# Patient Record
(demographics unavailable — no encounter records)

---

## 2024-10-26 NOTE — DISCUSSION/SUMMARY
[FreeTextEntry1] : pt was seen by cardiology all normal pt doing well in school followed by neuro due to head trauma and concussion lungs clear good air entry apply bactroban tid blood test Symptomatic therapy Practical allergen avoidance discussed Trial: Zyrtec, Allegra, or Claritin O.D and Flonase O.D. Call if no better 1 week recheck in office PRN

## 2024-10-26 NOTE — PHYSICAL EXAM
[Allergic Shiners] : allergic shiners [Symmetric Chest Wall] : symmetric chest wall [NL] : moves all extremities x4, warm, well perfused x4 [de-identified] : pain on palpation [de-identified] : oozing under right breast

## 2024-10-30 NOTE — HISTORY OF PRESENT ILLNESS
[Yes] : Patient goes to dentist yearly [Delayed] : Delayed [Normal] : normal [Days of Bleeding: _____] : Days of bleeding: [unfilled] [Eats meals with family] : eats meals with family [Has family members/adults to turn to for help] : has family members/adults to turn to for help [Is permitted and is able to make independent decisions] : Is permitted and is able to make independent decisions [Grade: ____] : Grade: [unfilled] [Normal Performance] : normal performance [Normal Behavior/Attention] : normal behavior/attention [Normal Homework] : normal homework [Eats regular meals including adequate fruits and vegetables] : eats regular meals including adequate fruits and vegetables [Drinks non-sweetened liquids] : drinks non-sweetened liquids  [Calcium source] : calcium source [Has friends] : has friends [At least 1 hour of physical activity a day] : at least 1 hour of physical activity a day [Screen time (except homework) less than 2 hours a day] : screen time (except homework) less than 2 hours a day [Has interests/participates in community activities/volunteers] : has interests/participates in community activities/volunteers. [Mother] : mother [Sleep Concerns] : no sleep concerns [Has concerns about body or appearance] : does not have concerns about body or appearance [Uses electronic nicotine delivery system] : does not use electronic nicotine delivery system [Exposure to electronic nicotine delivery system] : no exposure to electronic nicotine delivery system [Uses tobacco] : does not use tobacco [Exposure to tobacco] : no exposure to tobacco [Uses drugs] : does not use drugs  [Exposure to drugs] : no exposure to drugs [Drinks alcohol] : does not drink alcohol [Exposure to alcohol] : no exposure to alcohol [Uses safety belts/safety equipment] : uses safety belts/safety equipment  [Impaired/distracted driving] : no impaired/distracted driving [Has peer relationships free of violence] : has peer relationships free of violence [No] : Patient has not had sexual intercourse. [HIV Screening Declined] : HIV Screening Declined [Has ways to cope with stress] : has ways to cope with stress [Displays self-confidence] : displays self-confidence [Has problems with sleep] : does not have problems with sleep [Gets depressed, anxious, or irritable/has mood swings] : gets depressed, anxious, or irritable/has mood swings [With Teen] : teen [With Parent/Guardian] : parent/guardian [FreeTextEntry7] : Sees neurologist and psychologist.  [de-identified] : Since concussion of March 2020 has been having memory loss issues. Sees psychologists once a week. Also hasn't been feeling well past couple of days. Seen on Saturday for chest pain and stomach pain. Since then has developed nasal congestion and headache.

## 2024-10-30 NOTE — HISTORY OF PRESENT ILLNESS
[Yes] : Patient goes to dentist yearly [Delayed] : Delayed [Normal] : normal [Days of Bleeding: _____] : Days of bleeding: [unfilled] [Eats meals with family] : eats meals with family [Has family members/adults to turn to for help] : has family members/adults to turn to for help [Is permitted and is able to make independent decisions] : Is permitted and is able to make independent decisions [Grade: ____] : Grade: [unfilled] [Normal Performance] : normal performance [Normal Behavior/Attention] : normal behavior/attention [Normal Homework] : normal homework [Eats regular meals including adequate fruits and vegetables] : eats regular meals including adequate fruits and vegetables [Drinks non-sweetened liquids] : drinks non-sweetened liquids  [Calcium source] : calcium source [Has friends] : has friends [At least 1 hour of physical activity a day] : at least 1 hour of physical activity a day [Screen time (except homework) less than 2 hours a day] : screen time (except homework) less than 2 hours a day [Has interests/participates in community activities/volunteers] : has interests/participates in community activities/volunteers. [Mother] : mother [Sleep Concerns] : no sleep concerns [Has concerns about body or appearance] : does not have concerns about body or appearance [Uses electronic nicotine delivery system] : does not use electronic nicotine delivery system [Exposure to electronic nicotine delivery system] : no exposure to electronic nicotine delivery system [Uses tobacco] : does not use tobacco [Exposure to tobacco] : no exposure to tobacco [Uses drugs] : does not use drugs  [Exposure to drugs] : no exposure to drugs [Drinks alcohol] : does not drink alcohol [Exposure to alcohol] : no exposure to alcohol [Uses safety belts/safety equipment] : uses safety belts/safety equipment  [Impaired/distracted driving] : no impaired/distracted driving [Has peer relationships free of violence] : has peer relationships free of violence [No] : Patient has not had sexual intercourse. [HIV Screening Declined] : HIV Screening Declined [Has ways to cope with stress] : has ways to cope with stress [Displays self-confidence] : displays self-confidence [Has problems with sleep] : does not have problems with sleep [Gets depressed, anxious, or irritable/has mood swings] : gets depressed, anxious, or irritable/has mood swings [With Teen] : teen [With Parent/Guardian] : parent/guardian [FreeTextEntry7] : Sees neurologist and psychologist.  [de-identified] : Since concussion of March 2020 has been having memory loss issues. Sees psychologists once a week. Also hasn't been feeling well past couple of days. Seen on Saturday for chest pain and stomach pain. Since then has developed nasal congestion and headache.

## 2024-10-30 NOTE — DISCUSSION/SUMMARY
[Normal Growth] : growth [Normal Development] : development  [No Elimination Concerns] : elimination [Continue Regimen] : feeding [No Skin Concerns] : skin [Normal Sleep Pattern] : sleep [None] : no medical problems [Anticipatory Guidance Given] : Anticipatory guidance addressed as per the history of present illness section [Physical Growth and Development] : physical growth and development [Social and Academic Competence] : social and academic competence [Emotional Well-Being] : emotional well-being [Risk Reduction] : risk reduction [Violence and Injury Prevention] : violence and injury prevention [No Vaccines] : no vaccines needed [No Medications] : ~He/She~ is not on any medications [Patient] : patient [Mother] : mother [Parent/Guardian] : Parent/Guardian [Full Activity without restrictions including Physical Education & Athletics] : Full Activity without restrictions including Physical Education & Athletics [I have examined the above-named student and completed the preparticipation physical evaluation. The athlete does not present apparent clinical contraindications to practice and participate in sport(s) as outlined above. A copy of the physical exam is on r] : I have examined the above-named student and completed the preparticipation physical evaluation. The athlete does not present apparent clinical contraindications to practice and participate in sport(s) as outlined above. A copy of the physical exam is on record in my office and can be made available to the school at the request of the parents. If conditions arise after the athlete has been cleared for participation, the physician may rescind the clearance until the problem is resolved and the potential consequences are completely explained to the athlete (and parents/guardians). [FreeTextEntry1] : After my physical exam, I went to obtain RVP and patient started to have a panic attack and ran out of the room. Mother was able to calm patient down and she returned to the room. Once she was sitting on ethe exam table she was screaming, crying and unable to calm herself. Her eyes kept rolling back intermittently. Tried to apply cool compress and do breathing exercises to calm patient down with no improvement. EMS was called and Mother contacted father to come to office. Once EMS arrive patient started to become upset again and did not want to go to hospital. EMS contacted their supervisor. Supervisor and medical director arrived on site. While they arrived, father was able to stabilize patient and have her walk onto stretcher to be transported to [facility]'s ER. Patient voluntarily walked to stretcher and was transported successfully.   D-stick reading 88  Pulse Oximetry - 99% Pulse 134

## 2024-10-30 NOTE — ADDENDUM
[FreeTextEntry1] : Followed up with mother. patient was discharged home and to follow up with psych and neurology. Patient doesn't feel  with uri symptoms but has not had any more episodes since Monday. Mother reports patient is very loving and happy currently.  Discussed signs and symptoms that would required immediate care. Mother expressed understanding. All questions answered. Caretaker understands and agrees with plan. If (+) new or worsening symptoms or (+) parental concern - return to office

## 2024-10-30 NOTE — DISCUSSION/SUMMARY
[Normal Growth] : growth [Normal Development] : development  [No Elimination Concerns] : elimination [Continue Regimen] : feeding [No Skin Concerns] : skin [Normal Sleep Pattern] : sleep [None] : no medical problems [Anticipatory Guidance Given] : Anticipatory guidance addressed as per the history of present illness section [Physical Growth and Development] : physical growth and development [Social and Academic Competence] : social and academic competence [Emotional Well-Being] : emotional well-being [Risk Reduction] : risk reduction [Violence and Injury Prevention] : violence and injury prevention [No Vaccines] : no vaccines needed [No Medications] : ~He/She~ is not on any medications [Patient] : patient [Mother] : mother [Parent/Guardian] : Parent/Guardian [Full Activity without restrictions including Physical Education & Athletics] : Full Activity without restrictions including Physical Education & Athletics [I have examined the above-named student and completed the preparticipation physical evaluation. The athlete does not present apparent clinical contraindications to practice and participate in sport(s) as outlined above. A copy of the physical exam is on r] : I have examined the above-named student and completed the preparticipation physical evaluation. The athlete does not present apparent clinical contraindications to practice and participate in sport(s) as outlined above. A copy of the physical exam is on record in my office and can be made available to the school at the request of the parents. If conditions arise after the athlete has been cleared for participation, the physician may rescind the clearance until the problem is resolved and the potential consequences are completely explained to the athlete (and parents/guardians). [FreeTextEntry1] : After my physical exam, I went to obtain RVP and patient started to have a panic attack and ran out of the room. Mother was able to calm patient down and she returned to the room. Once she was sitting on ethe exam table she was screaming, crying and unable to calm herself. Her eyes kept rolling back intermittently. Tried to apply cool compress and do breathing exercises to calm patient down with no improvement. EMS was called and Mother contacted father to come to office. Once EMS arrive patient started to become upset again and did not want to go to hospital. EMS contacted their supervisor. Supervisor and medical director arrived on site. While they arrived, father was able to stabilize patient and have her walk onto stretcher to be transported to [facility]'s ER. Patient voluntarily walked to stretcher and was transported successfully.   D-stick reading 88  Pulse Oximetry - 99% Pulse 134

## 2024-10-30 NOTE — HISTORY OF PRESENT ILLNESS
[Yes] : Patient goes to dentist yearly [Delayed] : Delayed [Normal] : normal [Days of Bleeding: _____] : Days of bleeding: [unfilled] [Eats meals with family] : eats meals with family [Has family members/adults to turn to for help] : has family members/adults to turn to for help [Is permitted and is able to make independent decisions] : Is permitted and is able to make independent decisions [Grade: ____] : Grade: [unfilled] [Normal Performance] : normal performance [Normal Behavior/Attention] : normal behavior/attention [Normal Homework] : normal homework [Eats regular meals including adequate fruits and vegetables] : eats regular meals including adequate fruits and vegetables [Drinks non-sweetened liquids] : drinks non-sweetened liquids  [Calcium source] : calcium source [Has friends] : has friends [At least 1 hour of physical activity a day] : at least 1 hour of physical activity a day [Screen time (except homework) less than 2 hours a day] : screen time (except homework) less than 2 hours a day [Has interests/participates in community activities/volunteers] : has interests/participates in community activities/volunteers. [Mother] : mother [Sleep Concerns] : no sleep concerns [Has concerns about body or appearance] : does not have concerns about body or appearance [Uses electronic nicotine delivery system] : does not use electronic nicotine delivery system [Exposure to electronic nicotine delivery system] : no exposure to electronic nicotine delivery system [Uses tobacco] : does not use tobacco [Exposure to tobacco] : no exposure to tobacco [Uses drugs] : does not use drugs  [Exposure to drugs] : no exposure to drugs [Drinks alcohol] : does not drink alcohol [Exposure to alcohol] : no exposure to alcohol [Uses safety belts/safety equipment] : uses safety belts/safety equipment  [Impaired/distracted driving] : no impaired/distracted driving [Has peer relationships free of violence] : has peer relationships free of violence [No] : Patient has not had sexual intercourse. [HIV Screening Declined] : HIV Screening Declined [Has ways to cope with stress] : has ways to cope with stress [Displays self-confidence] : displays self-confidence [Has problems with sleep] : does not have problems with sleep [Gets depressed, anxious, or irritable/has mood swings] : gets depressed, anxious, or irritable/has mood swings [With Teen] : teen [With Parent/Guardian] : parent/guardian [FreeTextEntry7] : Sees neurologist and psychologist.  [de-identified] : Since concussion of March 2020 has been having memory loss issues. Sees psychologists once a week. Also hasn't been feeling well past couple of days. Seen on Saturday for chest pain and stomach pain. Since then has developed nasal congestion and headache.

## 2024-10-30 NOTE — DISCUSSION/SUMMARY
[Normal Growth] : growth [Normal Development] : development  [No Elimination Concerns] : elimination [Continue Regimen] : feeding [No Skin Concerns] : skin [Normal Sleep Pattern] : sleep [None] : no medical problems [Anticipatory Guidance Given] : Anticipatory guidance addressed as per the history of present illness section [Physical Growth and Development] : physical growth and development [Social and Academic Competence] : social and academic competence [Emotional Well-Being] : emotional well-being [Risk Reduction] : risk reduction [Violence and Injury Prevention] : violence and injury prevention [No Vaccines] : no vaccines needed [No Medications] : ~He/She~ is not on any medications [Patient] : patient [Mother] : mother [Parent/Guardian] : Parent/Guardian [Full Activity without restrictions including Physical Education & Athletics] : Full Activity without restrictions including Physical Education & Athletics [I have examined the above-named student and completed the preparticipation physical evaluation. The athlete does not present apparent clinical contraindications to practice and participate in sport(s) as outlined above. A copy of the physical exam is on r] : I have examined the above-named student and completed the preparticipation physical evaluation. The athlete does not present apparent clinical contraindications to practice and participate in sport(s) as outlined above. A copy of the physical exam is on record in my office and can be made available to the school at the request of the parents. If conditions arise after the athlete has been cleared for participation, the physician may rescind the clearance until the problem is resolved and the potential consequences are completely explained to the athlete (and parents/guardians). [FreeTextEntry1] : After my physical exam, I went to obtain RVP and patient started to have a panic attack and ran out of the room. Mother was able to calm patient down and she returned to the room. Once she was sitting on ethe exam table she was screaming, crying and unable to calm herself. Her eyes kept rolling back intermittently. Tried to apply cool compress and do breathing exercises to calm patient down with no improvement. EMS was called and Mother contacted father to come to office. Once EMS arrive patient started to become upset again and did not want to go to hospital. EMS contacted their supervisor. Supervisor and medical director arrived on site. While they arrived, father was able to stabilize patient and have her walk onto stretcher to be transported to [facility]'s ER. Patient voluntarily walked to stretcher and was transported successfully.   D-stick reading 88  Pulse Oximetry - 99% Pulse 134

## 2024-10-30 NOTE — DISCUSSION/SUMMARY
[Normal Growth] : growth [Normal Development] : development  [No Elimination Concerns] : elimination [Continue Regimen] : feeding [No Skin Concerns] : skin [Normal Sleep Pattern] : sleep [None] : no medical problems [Anticipatory Guidance Given] : Anticipatory guidance addressed as per the history of present illness section [Physical Growth and Development] : physical growth and development [Social and Academic Competence] : social and academic competence [Emotional Well-Being] : emotional well-being [Risk Reduction] : risk reduction [Violence and Injury Prevention] : violence and injury prevention [No Vaccines] : no vaccines needed [No Medications] : ~He/She~ is not on any medications [Patient] : patient [Mother] : mother [Parent/Guardian] : Parent/Guardian [Full Activity without restrictions including Physical Education & Athletics] : Full Activity without restrictions including Physical Education & Athletics [I have examined the above-named student and completed the preparticipation physical evaluation. The athlete does not present apparent clinical contraindications to practice and participate in sport(s) as outlined above. A copy of the physical exam is on r] : I have examined the above-named student and completed the preparticipation physical evaluation. The athlete does not present apparent clinical contraindications to practice and participate in sport(s) as outlined above. A copy of the physical exam is on record in my office and can be made available to the school at the request of the parents. If conditions arise after the athlete has been cleared for participation, the physician may rescind the clearance until the problem is resolved and the potential consequences are completely explained to the athlete (and parents/guardians). [FreeTextEntry1] : After my physical exam, I went to obtain RVP and patient started to have a panic attack and ran out of the room. Mother was able to calm patient down and she returned to the room. Once she was sitting on ethe exam table she was screaming, crying and unable to calm herself. Her eyes kept rolling back intermittently. Tried to apply cool compress and do breathing exercises to calm patient down with no improvement. EMS was called and Mother contacted father to come to office. Once EMS arrive patient started to become upset again and did not want to go to hospital. EMS contacted their supervisor. Supervisor and medical director arrived on site. While they arrived, father was able to stabilize patient and have her walk onto stretcher to be transported to [facility]'s ER. Patient voluntarily walked to stretcher and was transported successfully.   D-stick reading 88  Pulse Oximetry - 99% Pulse 134

## 2024-10-30 NOTE — HISTORY OF PRESENT ILLNESS
[Yes] : Patient goes to dentist yearly [Delayed] : Delayed [Normal] : normal [Days of Bleeding: _____] : Days of bleeding: [unfilled] [Eats meals with family] : eats meals with family [Has family members/adults to turn to for help] : has family members/adults to turn to for help [Is permitted and is able to make independent decisions] : Is permitted and is able to make independent decisions [Grade: ____] : Grade: [unfilled] [Normal Performance] : normal performance [Normal Behavior/Attention] : normal behavior/attention [Normal Homework] : normal homework [Eats regular meals including adequate fruits and vegetables] : eats regular meals including adequate fruits and vegetables [Drinks non-sweetened liquids] : drinks non-sweetened liquids  [Calcium source] : calcium source [Has friends] : has friends [At least 1 hour of physical activity a day] : at least 1 hour of physical activity a day [Screen time (except homework) less than 2 hours a day] : screen time (except homework) less than 2 hours a day [Has interests/participates in community activities/volunteers] : has interests/participates in community activities/volunteers. [Mother] : mother [Sleep Concerns] : no sleep concerns [Has concerns about body or appearance] : does not have concerns about body or appearance [Uses electronic nicotine delivery system] : does not use electronic nicotine delivery system [Exposure to electronic nicotine delivery system] : no exposure to electronic nicotine delivery system [Uses tobacco] : does not use tobacco [Exposure to tobacco] : no exposure to tobacco [Uses drugs] : does not use drugs  [Exposure to drugs] : no exposure to drugs [Drinks alcohol] : does not drink alcohol [Exposure to alcohol] : no exposure to alcohol [Uses safety belts/safety equipment] : uses safety belts/safety equipment  [Impaired/distracted driving] : no impaired/distracted driving [Has peer relationships free of violence] : has peer relationships free of violence [No] : Patient has not had sexual intercourse. [HIV Screening Declined] : HIV Screening Declined [Has ways to cope with stress] : has ways to cope with stress [Displays self-confidence] : displays self-confidence [Has problems with sleep] : does not have problems with sleep [Gets depressed, anxious, or irritable/has mood swings] : gets depressed, anxious, or irritable/has mood swings [With Teen] : teen [With Parent/Guardian] : parent/guardian [FreeTextEntry7] : Sees neurologist and psychologist.  [de-identified] : Since concussion of March 2020 has been having memory loss issues. Sees psychologists once a week. Also hasn't been feeling well past couple of days. Seen on Saturday for chest pain and stomach pain. Since then has developed nasal congestion and headache.

## 2025-01-11 NOTE — DISCUSSION/SUMMARY
[FreeTextEntry1] : 17 year old w/ strep (rapid positive) - Complete 10 days of antibiotics. After being on antibiotics for at least 24 hours patient less likely to spread infection. Advised to switch out toothbrush after day 2 or 3 to prevent reinfection. - Recommended supportive care, including antipyretics, fluids, gargling w/ warm water and salt, lozenges prn - If new or worsening symptoms or parental concern - return to office or ED.  Due to recent antibiotics use (was on keflex a week prior) will start w/ augmentin and recheck in 10 days to retest for eradication of strep

## 2025-02-08 NOTE — DISCUSSION/SUMMARY
[FreeTextEntry1] : possible post viral costochondritis  EKG performed and sent to cardiologist follow up cardiology  VSS increase fluids. motrin for pain. recheck PRN

## 2025-02-08 NOTE — PHYSICAL EXAM
[Symmetric Chest Wall] : symmetric chest wall [Tenderness With Palpation of Chest Wall] : tenderness with palpation of chest wall [NL] : warm, clear [de-identified] : chest tenderness to L side and sternum

## 2025-02-08 NOTE — HISTORY OF PRESENT ILLNESS
[de-identified] : chest pain left side for 2 weeks  [FreeTextEntry6] : L sided chest pain intermittent over the last 2 weeks sharp pain and tenderness to touch chest wall denies Ha, dizziness, cough, SOB, palpitations. states she had Viral URI last week no fevers

## 2025-04-02 NOTE — DISCUSSION/SUMMARY
[FreeTextEntry1] : Egg allergy discussed avoidance carry benadryl and epipen  take zyrtec qhs, may need to stop meds prior to allergist appt  allergist referral- has appt Dr Boxer 4/21/25 discussed reasons to seek immediate care f/u prn

## 2025-04-02 NOTE — HISTORY OF PRESENT ILLNESS
[de-identified] : allergy reaction since Monday [FreeTextEntry6] : c/o when she smells eggs in school she feels like her eyes get itchy and her throat closes up no hives/ wheezing/ vomiting   h/o egg allergy, carries epipen and benadryl  last took benadryl last night   today she feels well

## 2025-04-21 NOTE — PHYSICAL EXAM
[No Neck Mass] : no neck mass was observed [No LAD] : no lymphadenopathy [Normal Rate and Effort] : normal respiratory rhythm and effort [No Crackles] : no crackles [No Retractions] : no retractions [Wheezing] : no wheezing was heard [Normal Rate] : heart rate was normal  [Normal S1, S2] : normal S1 and S2 [Regular Rhythm] : with a regular rhythm [Normal Cervical Lymph Nodes] : cervical [No Rash] : no rash [Normal Mood] : mood was normal [Judgment and Insight Age Appropriate] : judgement and insight is age appropriate

## 2025-04-21 NOTE — HISTORY OF PRESENT ILLNESS
[de-identified] : Patient reports age 5 - she ate ice cream - she felt throat discomfort - within the community someone had charcoal and gave her some to swallow and she improved.    She saw allergist and was advised that she was allergic to egg.    Mother  the egg - she was given white and yolk individually with some "reaction".    She has accidentally eaten baked egg product in icing - she felt throat closing - lips itching and lip swelling - ER.      She feels sense of discomfort when she is in a room where she smells eggs - leaves the classroom and she leaves school.   No history of asthma

## 2025-04-21 NOTE — SOCIAL HISTORY
[House] : [unfilled] lives in a house  [None] : none [Single] : single [FreeTextEntry1] : [employer]  Lives with parents and sister  [Smokers in Household] : there are no smokers in the home

## 2025-04-21 NOTE — ASSESSMENT
[FreeTextEntry1] : Egg allergy:  Egg - ovomucoid  ImmunoCAP  IgE level  I have reviewed the option of Xolair  EpiPen renewed.   Patient instructed on the proper use of an EpiPen - precautions - follow up after use of EpiPen - need to have device available at all times Patient instructed to have Benadryl, in addition to EpiPen, available at all times - reviewed indications for use of Benadryl - dosing - precautions and follow up.